# Patient Record
Sex: FEMALE | URBAN - METROPOLITAN AREA
[De-identification: names, ages, dates, MRNs, and addresses within clinical notes are randomized per-mention and may not be internally consistent; named-entity substitution may affect disease eponyms.]

---

## 2022-05-05 ENCOUNTER — OFFICE VISIT (OUTPATIENT)
Dept: URBAN - METROPOLITAN AREA CLINIC 37 | Facility: CLINIC | Age: 47
End: 2022-05-05

## 2022-05-05 NOTE — HPI-ELEVATED LIVER ENZYMES
Patient is here for an inital consultation for elevated liver enzymes Initally detected on routine labs done by PCP on 03/2022, see below Patient admits/denies Alcohol use Patient admits/denies NSAIDs/statin use Patient admits/denies personal exposure to liver disease such as viral hepatitis Patient admits/ denies family history of liver disease or cancer